# Patient Record
Sex: FEMALE | Race: WHITE | NOT HISPANIC OR LATINO | Employment: OTHER | URBAN - METROPOLITAN AREA
[De-identification: names, ages, dates, MRNs, and addresses within clinical notes are randomized per-mention and may not be internally consistent; named-entity substitution may affect disease eponyms.]

---

## 2017-05-04 ENCOUNTER — GENERIC CONVERSION - ENCOUNTER (OUTPATIENT)
Dept: OTHER | Facility: OTHER | Age: 72
End: 2017-05-04

## 2017-05-04 DIAGNOSIS — Z12.31 ENCOUNTER FOR SCREENING MAMMOGRAM FOR MALIGNANT NEOPLASM OF BREAST: ICD-10-CM

## 2017-06-30 ENCOUNTER — HOSPITAL ENCOUNTER (OUTPATIENT)
Dept: MAMMOGRAPHY | Facility: MEDICAL CENTER | Age: 72
Discharge: HOME | End: 2017-06-30
Payer: MEDICARE

## 2017-06-30 DIAGNOSIS — Z12.31 ENCOUNTER FOR SCREENING MAMMOGRAM FOR MALIGNANT NEOPLASM OF BREAST: ICD-10-CM

## 2017-06-30 PROCEDURE — G0202 SCR MAMMO BI INCL CAD: HCPCS

## 2017-06-30 PROCEDURE — 77063 BREAST TOMOSYNTHESIS BI: CPT

## 2018-01-11 NOTE — MISCELLANEOUS
Message   Date: 19 Oct 2016 9:23 AM EST, Recorded By: Christina Eldridge For: Lady Ace   Caller: Darshan Oliveramaida, Self   Phone: [phone] (Home)   Reason: Other   pts  called and was concerned that pt had not had her breast U/S this year    pt had a 3D which was normal  explained to  that this is the new prorocol for breast density    Active Problems    1  Blood pressure elevated (401 9) (I10)   2  Breast self examination education, encounter for (V65 49) (Z71 89)   3  Dense breasts (793 82) (R92 2)   4  Encounter for screening for malignant neoplasm of cervix (V76 2) (Z12 4)   5  Encounter for screening mammogram for malignant neoplasm of breast (V76 12)   (Z12 31)   6  History of self breast exam   7  Osteoporosis (733 00) (M81 0)   8  Screening for human papillomavirus (HPV) (V73 81) (Z11 51)   9  Visit for routine gyn exam (V72 31) (Z01 419)   10  Vitamin D deficiency (268 9) (E55 9)    Current Meds   1  CVS Vitamin D CAPS Recorded   2  Daily Value Multivitamin TABS; Therapy: (Abran Torres) to Recorded   3  Flonase SUSP (Fluticasone Propionate); Therapy: (Recorded:05Jun2014) to Recorded   4  KlonoPIN 0 5 MG Oral Tablet (ClonazePAM); Therapy: (Recorded:[id number]) to Recorded   5  Move Free TABS; Therapy: (Abran Torres) to Recorded   6  ZyPREXA 10 MG Oral Tablet (OLANZapine); Therapy: (Recorded:[id number]) to Recorded    Allergies    1   Percodan TABS    Signatures   Electronically signed by : Ashley July, ; Oct 19 2016  9:24AM EST                       (Author)

## 2018-01-15 NOTE — MISCELLANEOUS
Message   Date: 04 May 2017 11:58 AM EST, Recorded By: Thaddeus Dancer For: Tuyet Neumann: Sergo Ortiz, Self   Phone: [phone] (Home)   Reason: Other   mammo script mailed to pt        Active Problems    1  Blood pressure elevated (401 9) (I10)   2  Breast self examination education, encounter for (V65 49) (Z71 89)   3  Dense breasts (793 82) (R92 2)   4  Encounter for screening for malignant neoplasm of cervix (V76 2) (Z12 4)   5  Encounter for screening mammogram for malignant neoplasm of breast (V76 12)   (Z12 31)   6  History of self breast exam   7  Osteoporosis (733 00) (M81 0)   8  Screening for human papillomavirus (HPV) (V73 81) (Z11 51)   9  Visit for routine gyn exam (V72 31) (Z01 419)   10  Vitamin D deficiency (268 9) (E55 9)    Current Meds   1  CVS Vitamin D CAPS Recorded   2  Daily Value Multivitamin TABS; Therapy: (Wenda Viviana) to Recorded   3  Flonase SUSP (Fluticasone Propionate); Therapy: (Recorded:05Jun2014) to Recorded   4  KlonoPIN 0 5 MG Oral Tablet (ClonazePAM); Therapy: (Recorded:[id number]) to Recorded   5  Move Free TABS; Therapy: (Wenda Viviana) to Recorded   6  ZyPREXA 10 MG Oral Tablet (OLANZapine); Therapy: (Recorded:[id number]) to Recorded    Allergies    1  Percodan TABS    Plan  Encounter for screening mammogram for malignant neoplasm of breast    · MAMMO SCREENING BILATERAL W 3D & CAD; Status:Hold For -  Scheduling,Retrospective Authorization;  Requested [id number];     Signatures   Electronically signed by : Kristina Jackson, ; May  4 2017 11:59AM EST                       (Author)

## 2018-01-17 NOTE — RESULT NOTES
Message   Osteopenia, bone density has improved     Verified Results  * DXA BONE DENSITY SPINE HIP AND PELVIS 20Jun2016 11:13AM [place]      Test Name Result Flag Reference   DXA BONE DENSITY SPINE HIP AND PELVIS (Report)     CENTRAL DXA SCAN     CLINICAL HISTORY:  72year old post-menopausal  female risk factors include postmenopausal, osteopenia, estrogen deficiency  TECHNIQUE: Bone densitometry was performed using a Hologic Tyler C bone densitometer  Regions of interest appear properly placed  There are no obvious fractures or other confounding variables which could limit the study  None     COMPARISON: Follow-up     RESULTS:    LUMBAR SPINE: L1-L3:   BMD 0 807 gm/cm2   T-score below normal, -1 9, however, L4 is excluded from evaluation due to the presence of degenerative or osteoarthritic changes which on the spine image also affect L3  The real T score is -2 35 and unchanged from 2014  Z-score 0 1     LEFT TOTAL HIP:   BMD 0 682 gm/cm2   T-score below normal, -2 1   Z-score -0 6     LEFT FEMORAL NECK:   BMD 0 637 gm/cm2   T-score below normal, -1 9 and 18% higher than in 2014  Statistically significant change  Z-score -0 1     The Frax score in this patient identifies the risk of a major osteoporotic fracture in the next 10 years at 10% and a hip fracture risk of 1 8%  ASSESSMENT:   1  Based on the WHO classification, this study identifies moderate osteopenia at the spine, total hip, and femoral neck areas and the patient is considered at risk for fracture  2  A daily intake of calcium of at least 1200 mg and vitamin D, 800-1000 IU, as well as weight bearing and muscle strengthening exercise, fall prevention and avoidance of tobacco and excessive alcohol intake as basic preventive measures are recommended  3  Repeat DXA scan in 18-24 months as clinically indicated        WHO CLASSIFICATION:   Normal (a T-score of -1 0 or higher)   Low bone mineral density (a T-score of less than -1 0 but higher than -2 5)   Osteoporosis (a T-score of -2 5 or less)   Severe osteoporosis (a T-score of -2 5 or less with a fragility fracture)      Thank you for allowing us the opportunity to participate in your patient care  The expanded DEXA report will no longer be arriving in your mail  If you desire to view the full report please contact [address] [place] [address] or access the PACS system         Workstation performed: [id number]

## 2018-08-16 ENCOUNTER — ANNUAL EXAM (OUTPATIENT)
Dept: OBGYN CLINIC | Facility: CLINIC | Age: 73
End: 2018-08-16
Payer: MEDICARE

## 2018-08-16 VITALS
HEIGHT: 65 IN | WEIGHT: 112.2 LBS | SYSTOLIC BLOOD PRESSURE: 120 MMHG | DIASTOLIC BLOOD PRESSURE: 70 MMHG | BODY MASS INDEX: 18.69 KG/M2

## 2018-08-16 DIAGNOSIS — M85.80 OSTEOPENIA, UNSPECIFIED LOCATION: ICD-10-CM

## 2018-08-16 DIAGNOSIS — Z01.419 ENCOUNTER FOR ANNUAL ROUTINE GYNECOLOGICAL EXAMINATION: Primary | ICD-10-CM

## 2018-08-16 DIAGNOSIS — Z12.31 ENCOUNTER FOR SCREENING MAMMOGRAM FOR BREAST CANCER: ICD-10-CM

## 2018-08-16 PROCEDURE — G0101 CA SCREEN;PELVIC/BREAST EXAM: HCPCS | Performed by: OBSTETRICS & GYNECOLOGY

## 2018-08-16 RX ORDER — ERGOCALCIFEROL (VITAMIN D2) 10 MCG
TABLET ORAL
COMMUNITY

## 2018-08-16 RX ORDER — CLONAZEPAM 0.5 MG/1
0.5 TABLET ORAL 4 TIMES DAILY
COMMUNITY
Start: 2018-08-15 | End: 2018-10-09

## 2018-08-16 RX ORDER — FLUTICASONE PROPIONATE 50 MCG
SPRAY, SUSPENSION (ML) NASAL
Refills: 1 | COMMUNITY
Start: 2018-07-10 | End: 2018-08-16

## 2018-08-16 RX ORDER — OLANZAPINE 10 MG/1
10 TABLET ORAL DAILY
Refills: 1 | COMMUNITY
Start: 2018-05-30

## 2018-08-16 RX ORDER — LANOLIN ALCOHOL/MO/W.PET/CERES
2 CREAM (GRAM) TOPICAL
COMMUNITY
Start: 2013-10-03 | End: 2020-07-10

## 2018-08-16 RX ORDER — YEAST,DRIED (S. CEREVISIAE)
POWDER (GRAM) ORAL
COMMUNITY

## 2018-08-16 RX ORDER — DIPHENOXYLATE HYDROCHLORIDE AND ATROPINE SULFATE 2.5; .025 MG/1; MG/1
1 TABLET ORAL
COMMUNITY
Start: 2012-05-14 | End: 2018-10-09

## 2018-08-16 RX ORDER — ERYTHROMYCIN 5 MG/G
OINTMENT OPHTHALMIC
Refills: 0 | COMMUNITY
Start: 2018-07-30 | End: 2020-07-10

## 2018-08-16 RX ORDER — FLUTICASONE PROPIONATE 50 MCG
SPRAY, SUSPENSION (ML) NASAL
COMMUNITY
End: 2018-08-16

## 2018-08-16 RX ORDER — CLONAZEPAM 0.5 MG/1
0.5 TABLET ORAL
COMMUNITY
Start: 2015-04-14

## 2018-08-16 RX ORDER — FLUTICASONE PROPIONATE 50 MCG
2 SPRAY, SUSPENSION (ML) NASAL
COMMUNITY
Start: 2018-07-10 | End: 2018-08-16

## 2018-08-16 RX ORDER — ERYTHROMYCIN 5 MG/G
OINTMENT OPHTHALMIC
COMMUNITY
Start: 2018-07-30 | End: 2018-10-09

## 2018-08-16 NOTE — PROGRESS NOTES
Assessment/Plan:    Dense breast tissue-mammogram is due now, 3D mammogram ordered and she will have this scheduled    Osteopenia-she exercises regularly, she takes calcium and vitamin-D and DEXA was ordered as she is due    Colonoscopy is up to date    No problem-specific Assessment & Plan notes found for this encounter  Diagnoses and all orders for this visit:    Encounter for annual routine gynecological examination    Encounter for screening mammogram for breast cancer  -     Mammo screening bilateral w 3d & cad; Future    Osteopenia, unspecified location  -     DXA bone density spine hip and pelvis; Future    Other orders  -     clonazePAM (KlonoPIN) 0 5 mg tablet;   -     clonazePAM (KlonoPIN) 0 5 mg tablet; Take 0 5 mg by mouth  -     Cholecalciferol (CVS VITAMIN D) 2000 units CAPS; Take by mouth  -     Multiple Vitamin (DAILY VALUE MULTIVITAMIN) TABS; Take by mouth  -     erythromycin (ILOTYCIN) ophthalmic ointment; APPLY TO THE RIGHT LOWER LID THREE TIMES DAILY UNTIL CLEAR  -     erythromycin (ILOTYCIN) ophthalmic ointment; Apply to right lower lid three times per day until clear  -     Discontinue: fluticasone (FLONASE) 50 mcg/act nasal spray; into each nostril  -     Discontinue: fluticasone (FLONASE) 50 mcg/act nasal spray; 2 sprays into each nostril  -     Discontinue: fluticasone (FLONASE) 50 mcg/act nasal spray; SPRAY 2 SPRAYS INTO EACH NOSTRIL EVERY DAY  -     glucosamine-chondroitin 500-400 MG tablet; Take 2 tablets by mouth  -     Glucos-Chond-Hyal Ac-Ca Fructo (MOVE FREE [facility]) TABS; Take by mouth  -     multivitamin (THERAGRAN) TABS; Take 1 tablet by mouth  -     OLANZapine (ZyPREXA) 10 mg tablet; Take 10 mg by mouth daily          Subjective:      Patient ID: Ti Sharma is a 71 y o  female  Patient here for yearly  She has no gyn complaints  She had sciatic pain this past year but this seems to be getting better    She is due for mammogram, she has dense breast tissue  She is also due for a DEXA scan as she has osteopenia that did not require treatment  The following portions of the patient's history were reviewed and updated as appropriate: allergies, current medications, past family history, past medical history, past social history, past surgical history and problem list     Review of Systems   Constitutional: Negative  HENT: Negative  Eyes: Negative  Respiratory: Negative  Cardiovascular: Negative  Gastrointestinal: Negative  Endocrine: Negative  Genitourinary: Negative  Musculoskeletal: Negative  Skin: Negative  Allergic/Immunologic: Negative  Neurological: Negative  Hematological: Negative  Psychiatric/Behavioral: Negative  Objective:      /70 (BP Location: Left arm, Patient Position: Sitting)   Ht 5' 5" (1 651 m)   Wt 50 9 kg (112 lb 3 2 oz)   Breastfeeding? No   BMI 18 67 kg/m²          Physical Exam   Constitutional: She appears well-developed  Neck: No tracheal deviation present  No thyromegaly present  Cardiovascular: Normal rate and regular rhythm  Pulmonary/Chest: Effort normal and breath sounds normal  Right breast exhibits no inverted nipple, no mass, no nipple discharge, no skin change and no tenderness  Left breast exhibits no inverted nipple, no mass, no nipple discharge, no skin change and no tenderness  Breasts are symmetrical    Examined seated and supine   Abdominal: Soft  She exhibits no distension and no mass  There is no tenderness  Genitourinary: Rectum normal, vagina normal and uterus normal  No labial fusion  There is no rash, tenderness, lesion or injury on the right labia  There is no rash, tenderness, lesion or injury on the left labia  Cervix exhibits no motion tenderness, no discharge and no friability  Right adnexum displays no mass, no tenderness and no fullness  Left adnexum displays no mass, no tenderness and no fullness  Vitals reviewed

## 2018-09-14 ENCOUNTER — HOSPITAL ENCOUNTER (OUTPATIENT)
Dept: MAMMOGRAPHY | Facility: MEDICAL CENTER | Age: 73
Discharge: HOME | End: 2018-09-14
Payer: MEDICARE

## 2018-09-14 ENCOUNTER — HOSPITAL ENCOUNTER (OUTPATIENT)
Dept: BONE DENSITY | Facility: MEDICAL CENTER | Age: 73
Discharge: HOME | End: 2018-09-14
Payer: MEDICARE

## 2018-09-14 DIAGNOSIS — Z12.31 ENCOUNTER FOR SCREENING MAMMOGRAM FOR BREAST CANCER: ICD-10-CM

## 2018-09-14 DIAGNOSIS — M85.80 OSTEOPENIA, UNSPECIFIED LOCATION: ICD-10-CM

## 2018-09-14 PROCEDURE — 77067 SCR MAMMO BI INCL CAD: CPT

## 2018-09-14 PROCEDURE — 77063 BREAST TOMOSYNTHESIS BI: CPT

## 2018-09-14 PROCEDURE — 77080 DXA BONE DENSITY AXIAL: CPT

## 2018-09-18 ENCOUNTER — TELEPHONE (OUTPATIENT)
Dept: ENDOCRINOLOGY | Facility: CLINIC | Age: 73
End: 2018-09-18

## 2018-09-18 NOTE — TELEPHONE ENCOUNTER
----- Message from Chasity Lopez MD sent at 9/18/2018  8:56 AM EDT -----  Please call the patient regarding her abnormal result  Has osteoporosis  She needs a follow-up appointment  We have not seen her since 2016

## 2018-09-18 NOTE — TELEPHONE ENCOUNTER
Spoke to  and provided DXA scan and patient has an appt with Piedad Edmondson on 9/19  Will discuss  Findings with Billy Rodriguez at that appt

## 2018-09-18 NOTE — PROGRESS NOTES
Please call the patient regarding her abnormal result  Has osteoporosis  She needs a follow-up appointment  We have not seen her since 2016

## 2018-09-19 ENCOUNTER — OFFICE VISIT (OUTPATIENT)
Dept: ENDOCRINOLOGY | Facility: CLINIC | Age: 73
End: 2018-09-19
Payer: MEDICARE

## 2018-09-19 VITALS
WEIGHT: 113.6 LBS | DIASTOLIC BLOOD PRESSURE: 68 MMHG | HEART RATE: 93 BPM | SYSTOLIC BLOOD PRESSURE: 110 MMHG | HEIGHT: 64 IN | BODY MASS INDEX: 19.39 KG/M2

## 2018-09-19 DIAGNOSIS — M81.0 AGE RELATED OSTEOPOROSIS, UNSPECIFIED PATHOLOGICAL FRACTURE PRESENCE: ICD-10-CM

## 2018-09-19 DIAGNOSIS — E55.9 VITAMIN D DEFICIENCY: Primary | ICD-10-CM

## 2018-09-19 PROCEDURE — 99213 OFFICE O/P EST LOW 20 MIN: CPT | Performed by: NURSE PRACTITIONER

## 2018-09-19 RX ORDER — FLUTICASONE PROPIONATE 50 MCG
1 SPRAY, SUSPENSION (ML) NASAL DAILY
COMMUNITY

## 2018-09-19 NOTE — PATIENT INSTRUCTIONS
Denosumab (By injection)   Denosumab ([id number]-[id number])  Treats osteoporosis, bone cancer, hypercalcemia, and other bone problems in patients who have cancer  Brand Name(s): Prolia, Xgeva   There may be other brand names for this medicine  When This Medicine Should Not Be Used: This medicine is not right for everyone  You should not receive it if you had an allergic reaction to denosumab or if you are pregnant  How to Use This Medicine:   Injectable  · A doctor or other health professional will give you this medicine  This medicine is usually given as a shot under the skin of your upper arm, upper thigh, or stomach  · This medicine should come with a Medication Guide  Ask your pharmacist for a copy if you do not have one  · Missed dose: Call your doctor or pharmacist for instructions  Drugs and Foods to Avoid:   Ask your doctor or pharmacist before using any other medicine, including over-the-counter medicines, vitamins, and herbal products  · Do not use Prolia® and Xgeva® together  They contain the same medicine  · Some medicines can affect how denosumab works  Tell your doctor if you are also using medicine that weakens your immune system, including a steroid or cancer medicine  Warnings While Using This Medicine:   · This medicine may cause birth defects if either partner is using it during conception or pregnancy  Tell your doctor right away if you or your partner becomes pregnant  Use an effective form of birth control  Women who are being treated with Eva Banana should continue using birth control for at least 5 months after the last dose  · Tell your doctor if you are breastfeeding, or if you have kidney disease, diabetes, gum disease, or an allergy to latex  Tell your doctor if you have problems with your thyroid, parathyroid, or digestive system    · This medicine may cause the following problems:  ¨ Low calcium levels in your blood  ¨ Increased risk of broken thigh bone  ¨ Increased risk of infections  ¨ Serious skin reactions  ¨ Severe bone, joint, or muscle pain  · This medicine can cause jaw problems  You must have regular dental exams while you are being treated with this medicine  Tell your dentist that you are using this medicine  Practice good oral hygiene  · Do not suddenly stop using Prolia® without checking first with your doctor  Doing so may increase risk for more fractures  Talk to your doctor about other medicine that you can take  · Your doctor will do lab tests at regular visits to check on the effects of this medicine  Keep all appointments  Possible Side Effects While Using This Medicine:   Call your doctor right away if you notice any of these side effects:  · Allergic reaction: Itching or hives, swelling in your face or hands, swelling or tingling in your mouth or throat, chest tightness, trouble breathing  · Blistering, peeling, red skin rash  · Chest pain, fast or uneven heartbeat, trouble breathing  · Fever, chills, cough, sore throat, body aches  · Lightheadedness, dizziness, fainting  · Muscle spasms or twitching, numbness or tingling in your fingers, toes, or lips  · Pain or burning during urination, change in how much or how often you urinate  · Pain, swelling, heavy feeling, or numbness in your mouth or jaw, loose teeth or other teeth problems  · Severe bone, joint, or muscle pain  · Unusual pain in your thigh, groin, or hip  If you notice these less serious side effects, talk with your doctor:   · Diarrhea, nausea  · Redness, pain, itching, burning, swelling, or a lump under your skin where the shot was given  · Tiredness or weakness  If you notice other side effects that you think are caused by this medicine, tell your doctor  Call your doctor for medical advice about side effects   You may report side effects to FDA at 7-945-FDA-6258  © 2017 [employer] Information is for End User's use only and may not be sold, redistributed or otherwise used for commercial purposes  The above information is an  only  It is not intended as medical advice for individual conditions or treatments  Talk to your doctor, nurse or pharmacist before following any medical regimen to see if it is safe and effective for you

## 2018-09-19 NOTE — PROGRESS NOTES
Established Patient Progress Note       Chief Complaint   Patient presents with    Osteoporosis        History of Present Illness:     Daisy Wood is a 71 y o  female with a history of osteoporosis and vitamin d deficiency  For the osteoporosis she is currently taking calcium and vitamin d supplementation in addition to dietary calcium  Her most recent dexa scan showed T score of -2 6 in left total hip  She does report some pain in left hip  She was previously on reclast and add a total of four doses back in 2016  Her dexa at that time showed improvement of her osteoporosis to osteopenia and reclast was stopped  She denies any recent falls or fractures  Patient Active Problem List   Diagnosis    Osteoporosis    Vitamin D deficiency      History reviewed  No pertinent past medical history  History reviewed  No pertinent surgical history  History reviewed  No pertinent family history    Social History   Substance Use Topics    Smoking status: Never Smoker    Smokeless tobacco: Never Used    Alcohol use No     Allergies   Allergen Reactions    Oxycodone GI Intolerance       Current Outpatient Prescriptions:     Cholecalciferol ([employer] VITAMIN D) 2000 units CAPS, Take by mouth, Disp: , Rfl:     clonazePAM (KlonoPIN) 0 5 mg tablet, Take 0 5 mg by mouth Takes morning at breakfast, after lunch and after dinner, bedtime , Disp: , Rfl:     erythromycin (ILOTYCIN) ophthalmic ointment, APPLY TO THE RIGHT LOWER LID THREE TIMES DAILY UNTIL CLEAR, Disp: , Rfl: 0    fluticasone (FLONASE) 50 mcg/act nasal spray, 1 spray into each nostril daily, Disp: , Rfl:     Glucos-Chond-Hyal Ac-Ca Fructo (MOVE FREE JOINT HEALTH ADVANCE) TABS, Take by mouth, Disp: , Rfl:     glucosamine-chondroitin 500-400 MG tablet, Take 2 tablets by mouth, Disp: , Rfl:     Multiple Vitamin (DAILY VALUE MULTIVITAMIN) TABS, Take by mouth, Disp: , Rfl:     multivitamin (THERAGRAN) TABS, Take 1 tablet by mouth, Disp: , Rfl:    OLANZapine (ZyPREXA) 10 mg tablet, Take 10 mg by mouth daily, Disp: , Rfl: 1    clonazePAM (KlonoPIN) 0 5 mg tablet, , Disp: , Rfl:     erythromycin (ILOTYCIN) ophthalmic ointment, Apply to right lower lid three times per day until clear, Disp: , Rfl:     Review of Systems   Constitutional: Negative for chills and fever  HENT: Negative for sore throat and trouble swallowing  Eyes: Negative for visual disturbance  Respiratory: Negative for shortness of breath  Cardiovascular: Negative for chest pain and palpitations  Gastrointestinal: Negative for abdominal pain, constipation and diarrhea  Endocrine: Negative for cold intolerance, heat intolerance, polydipsia, polyphagia and polyuria  Genitourinary: Negative for frequency  Musculoskeletal: Negative for arthralgias and myalgias  Skin: Negative for rash  Neurological: Negative for dizziness and tremors  Hematological: Negative for adenopathy  Psychiatric/Behavioral: Negative for sleep disturbance  All other systems reviewed and are negative  Physical Exam:  Body mass index is 19 5 kg/m²  /68   Pulse 93   Ht 5' 4" (1 626 m)   Wt 51 5 kg (113 lb 9 6 oz)   BMI 19 50 kg/m²    Wt Readings from Last 3 Encounters:   09/19/18 51 5 kg (113 lb 9 6 oz)   08/16/18 50 9 kg (112 lb 3 2 oz)   10/24/16 58 1 kg (128 lb 0 6 oz)       Physical Exam   Constitutional: She is oriented to person, place, and time  She appears well-developed and well-nourished  No distress  HENT:   Head: Normocephalic and atraumatic  Eyes: Conjunctivae are normal  Pupils are equal, round, and reactive to light  Neck: Normal range of motion  Neck supple  No thyromegaly present  Cardiovascular: Normal rate, regular rhythm and normal heart sounds  Pulmonary/Chest: Effort normal and breath sounds normal  No respiratory distress  She has no wheezes  She has no rales  Abdominal: Soft  Bowel sounds are normal  She exhibits no distension   There is no tenderness  Musculoskeletal: Normal range of motion  She exhibits no edema  Neurological: She is alert and oriented to person, place, and time  Skin: Skin is warm and dry  Psychiatric: She has a normal mood and affect  Vitals reviewed  Labs:       CENTRAL  DXA SCAN     CLINICAL HISTORY:   71year old post-menopausal  female risk factors include estrogen deficiency  Osteopenia      TECHNIQUE: Bone densitometry was performed using a [employer] W bone densitometer  Regions of interest appear properly placed  There are   other confounding variables which could limit the study  Degenerative or osteoarthritic changes are noted on   the spine image eliminating L4 from evaluation      COMPARISON:  Follow-up      RESULTS:   LUMBAR SPINE:  L1-L3:  BMD 0 802 gm/cm2  T-score below normal, -2 0 and unchanged from 2016 and 1% less than 2014  Z-score 0 2     LEFT TOTAL HIP:  BMD 0 631 gm/cm2  T-score below normal, -2 6, osteoporosis  Z-score -0 9     LEFT FEMORAL NECK:  BMD 0 609 gm/cm2  T-score below normal, -2 2 and 4% less than 2016 but 14% higher than 2014 when the T score was -2 8   Z-score -0 3     The left forearm BMD is 0 594 and the T score is below normal, -1 7  The Z score is 0 6      A 25-hydroxy vitamin D level, an intact PTH, and a comprehensive metabolic panel are suggested in this patient prior to treatment      Treatment might include intravenous Reclast or Prolia     IMPRESSION:  1   Based on the [facility] classification, this study identifies a diagnosis of osteoporosis notable at the total hip area and the patient is considered at  risk for fracture         2  A daily intake of calcium of at least 1200 mg and vitamin D, 800-1000 IU, as well as weight bearing and muscle strengthening exercise, fall prevention and avoidance of tobacco and excessive alcohol intake as basic preventive measures are recommended      3  Repeat DXA scan on the same equipment in 18-24 months as clinically indicated        Impression & Plan:    Problem List Items Addressed This Visit     Osteoporosis     Patient has had progression from osteopenia to osteoporosis based on recent dexa scan since reclast was stopped in 2016  Based on dexa scan results recommend PTH, vitamin d, and cmp  Based on results will start patient on Prolia  Educated on falls prevention and weight bearing exercise  Relevant Orders    Vitamin D 25 hydroxy    Comprehensive metabolic panel    PTH, intact Lab Collect Lab Collect    Vitamin D deficiency - Primary     Continue vitamin d supplementation          Relevant Orders    Vitamin D 25 hydroxy          Orders Placed This Encounter   Procedures    Vitamin D 25 hydroxy    Comprehensive metabolic panel     This is a patient instruction: Patient fasting for 8 hours or longer recommended   PTH, intact Lab Collect Lab Collect     Standing Status:   Future     Standing Expiration Date:   9/19/2019       Patient Instructions   Denosumab (By injection)   Denosumab ([id number])  Treats osteoporosis, bone cancer, hypercalcemia, and other bone problems in patients who have cancer  Brand Name(s): Prolia, Xgeva   There may be other brand names for this medicine  When This Medicine Should Not Be Used: This medicine is not right for everyone  You should not receive it if you had an allergic reaction to denosumab or if you are pregnant  How to Use This Medicine:   Injectable  · A doctor or other health professional will give you this medicine  This medicine is usually given as a shot under the skin of your upper arm, upper thigh, or stomach  · This medicine should come with a Medication Guide  Ask your pharmacist for a copy if you do not have one  · Missed dose: Call your doctor or pharmacist for instructions  Drugs and Foods to Avoid:   Ask your doctor or pharmacist before using any other medicine, including over-the-counter medicines, vitamins, and herbal products    · Do not use Prolia® and Rayetta Tesfaye together  They contain the same medicine  · Some medicines can affect how denosumab works  Tell your doctor if you are also using medicine that weakens your immune system, including a steroid or cancer medicine  Warnings While Using This Medicine:   · This medicine may cause birth defects if either partner is using it during conception or pregnancy  Tell your doctor right away if you or your partner becomes pregnant  Use an effective form of birth control  Women who are being treated with Rayetta Tesfaye should continue using birth control for at least 5 months after the last dose  · Tell your doctor if you are breastfeeding, or if you have kidney disease, diabetes, gum disease, or an allergy to latex  Tell your doctor if you have problems with your thyroid, parathyroid, or digestive system  · This medicine may cause the following problems:  ¨ Low calcium levels in your blood  ¨ Increased risk of broken thigh bone  ¨ Increased risk of infections  ¨ Serious skin reactions  ¨ Severe bone, joint, or muscle pain  · This medicine can cause jaw problems  You must have regular dental exams while you are being treated with this medicine  Tell your dentist that you are using this medicine  Practice good oral hygiene  · Do not suddenly stop using Prolia® without checking first with your doctor  Doing so may increase risk for more fractures  Talk to your doctor about other medicine that you can take  · Your doctor will do lab tests at regular visits to check on the effects of this medicine  Keep all appointments    Possible Side Effects While Using This Medicine:   Call your doctor right away if you notice any of these side effects:  · Allergic reaction: Itching or hives, swelling in your face or hands, swelling or tingling in your mouth or throat, chest tightness, trouble breathing  · Blistering, peeling, red skin rash  · Chest pain, fast or uneven heartbeat, trouble breathing  · Fever, chills, cough, sore throat, body aches  · Lightheadedness, dizziness, fainting  · Muscle spasms or twitching, numbness or tingling in your fingers, toes, or lips  · Pain or burning during urination, change in how much or how often you urinate  · Pain, swelling, heavy feeling, or numbness in your mouth or jaw, loose teeth or other teeth problems  · Severe bone, joint, or muscle pain  · Unusual pain in your thigh, groin, or hip  If you notice these less serious side effects, talk with your doctor:   · Diarrhea, nausea  · Redness, pain, itching, burning, swelling, or a lump under your skin where the shot was given  · Tiredness or weakness  If you notice other side effects that you think are caused by this medicine, tell your doctor  Call your doctor for medical advice about side effects  You may report side effects to FDA at 6-492-FDA-6227  © 2017 [address] Information is for End User's use only and may not be sold, redistributed or otherwise used for commercial purposes  The above information is an  only  It is not intended as medical advice for individual conditions or treatments  Talk to your doctor, nurse or pharmacist before following any medical regimen to see if it is safe and effective for you  Discussed with the patient and all questioned fully answered  She will call me if any problems arise  Follow-up appointment in 6 months       Counseled patient on diagnostic results, prognosis, risk and benefit of treatment options, instruction for management, importance of treatment compliance, Risk  factor reduction and impressions      Jorge Luis Biswas [address]

## 2018-09-19 NOTE — ASSESSMENT & PLAN NOTE
Patient has had progression from osteopenia to osteoporosis based on recent dexa scan since reclast was stopped in 2016  Based on dexa scan results recommend PTH, vitamin d, and cmp  Based on results will start patient on Prolia  Educated on falls prevention and weight bearing exercise

## 2018-09-27 ENCOUNTER — TELEPHONE (OUTPATIENT)
Dept: ENDOCRINOLOGY | Facility: CLINIC | Age: 73
End: 2018-09-27

## 2018-09-28 ENCOUNTER — TELEPHONE (OUTPATIENT)
Dept: ENDOCRINOLOGY | Facility: HOSPITAL | Age: 73
End: 2018-09-28

## 2018-10-02 NOTE — TELEPHONE ENCOUNTER
Patient was called and left a message on 9/24/18, 9/25/18 and 9/28/18 to schedule Prolia  We will try to get in contact with patient again

## 2018-10-09 ENCOUNTER — OFFICE VISIT (OUTPATIENT)
Dept: ENDOCRINOLOGY | Facility: CLINIC | Age: 73
End: 2018-10-09
Payer: MEDICARE

## 2018-10-09 VITALS
SYSTOLIC BLOOD PRESSURE: 120 MMHG | WEIGHT: 114 LBS | BODY MASS INDEX: 19.46 KG/M2 | DIASTOLIC BLOOD PRESSURE: 60 MMHG | HEART RATE: 92 BPM | HEIGHT: 64 IN

## 2018-10-09 DIAGNOSIS — M81.0 OSTEOPOROSIS, UNSPECIFIED OSTEOPOROSIS TYPE, UNSPECIFIED PATHOLOGICAL FRACTURE PRESENCE: Primary | ICD-10-CM

## 2018-10-09 DIAGNOSIS — E55.9 VITAMIN D DEFICIENCY: ICD-10-CM

## 2018-10-09 PROCEDURE — 99213 OFFICE O/P EST LOW 20 MIN: CPT | Performed by: INTERNAL MEDICINE

## 2018-10-09 PROCEDURE — 96372 THER/PROPH/DIAG INJ SC/IM: CPT | Performed by: INTERNAL MEDICINE

## 2018-10-09 NOTE — PROGRESS NOTES
Kinza Diaz 68 y o  female MRN: [id number]    Encounter: [id number]      Assessment/Plan     Assessment: This is a 68y o -year-old female with worsening osteoporosis  Plan:  1  Osteoporosis-today, we had a long discussion about using Prolia to treat her osteoporosis  She is agreeable to this  She will receive a Prolia injection today  CC:   Osteoporosis    History of Present Illness     HPI:  60-year-old woman with worsening osteoporosis at the left hip who presents for follow-up  She had many questions about Prolia injection  These were all answered  She denies any recent fractures  She had four total infusions of Reclast for the osteoporosis and despite this, her bone density at the left hip declined  Review of Systems    Historical Information   History reviewed  No pertinent past medical history  History reviewed  No pertinent surgical history  Social History   History   Alcohol Use No     History   Drug Use No     History   Smoking Status    Never Smoker   Smokeless Tobacco    Never Used     Family History: History reviewed  No pertinent family history  Meds/Allergies   Current Outpatient Prescriptions   Medication Sig Dispense Refill    Cholecalciferol ([employer]S VITAMIN D) 2000 units CAPS Take by mouth      clonazePAM (KlonoPIN) 0 5 mg tablet Take 0 5 mg by mouth Takes morning at breakfast, after lunch and after dinner, bedtime       erythromycin (ILOTYCIN) ophthalmic ointment APPLY TO THE RIGHT LOWER LID THREE TIMES DAILY UNTIL CLEAR  0    fluticasone (FLONASE) 50 mcg/act nasal spray 1 spray into each nostril daily      Glucos-Chond-Hyal Ac-Ca Fructo (MOVE FREE JOINT HEALTH ADVANCE) TABS Take by mouth      glucosamine-chondroitin 500-400 MG tablet Take 2 tablets by mouth      Multiple Vitamin (DAILY VALUE MULTIVITAMIN) TABS Take by mouth      OLANZapine (ZyPREXA) 10 mg tablet Take 10 mg by mouth daily  1     No current facility-administered medications for this visit  Allergies   Allergen Reactions    Oxycodone GI Intolerance       Objective   Vitals: Blood pressure 120/60, pulse 92, height 5' 4" (1 626 m), weight 51 7 kg (114 lb), not currently breastfeeding  Physical Exam    The history was obtained from the review of the chart, patient  Lab Results:          Imaging Studies:            Results for orders placed during the hospital encounter of 09/14/18   DXA bone density spine hip and pelvis    Impression 1  Based on the [facility] classification, this study identifies a diagnosis of osteoporosis notable at the total hip area and the patient is considered at  risk for fracture  2  A daily intake of calcium of at least 1200 mg and vitamin D, 800-1000 IU, as well as weight bearing and muscle strengthening exercise, fall prevention and avoidance of tobacco and excessive alcohol intake as basic preventive measures are recommended  3  Repeat DXA scan on the same equipment in 18-24 months as clinically indicated  The 10 year risk of hip fracture is 2 7%, with the 10 year risk of major osteoporotic fracture being 11%, as calculated by the [facility] fracture risk assessment tool (FRAX)  The current NOF guidelines recommend treating patients with FRAX 10 year risk score   of >3% for hip fracture and >20% for major osteoporotic fracture  WHO CLASSIFICATION:  Normal (a T-score of -1 0 or higher)  Low bone mineral density (a T-score of less than -1 0 but higher than -2 5)  Osteoporosis (a T-score of -2 5 or less)  Severe osteoporosis (a T-score of -2 5 or less with a fragility fracture)    Thank you for allowing us the opportunity to participate in your patient care  The expanded DEXA report will no longer be arriving in your mail  If you desire to view the full report please contact [address] [place] [address] or access the PACS system  Workstation performed: [id number]         I have personally reviewed pertinent reports        Portions of the record may have been created with voice recognition software  Occasional wrong word or "sound a like" substitutions may have occurred due to the inherent limitations of voice recognition software  Read the chart carefully and recognize, using context, where substitutions have occurred

## 2019-04-08 ENCOUNTER — TELEPHONE (OUTPATIENT)
Dept: ENDOCRINOLOGY | Facility: CLINIC | Age: 74
End: 2019-04-08

## 2019-04-12 ENCOUNTER — OFFICE VISIT (OUTPATIENT)
Dept: ENDOCRINOLOGY | Facility: CLINIC | Age: 74
End: 2019-04-12
Payer: MEDICARE

## 2019-04-12 DIAGNOSIS — M81.0 OSTEOPOROSIS, UNSPECIFIED OSTEOPOROSIS TYPE, UNSPECIFIED PATHOLOGICAL FRACTURE PRESENCE: Primary | ICD-10-CM

## 2019-09-10 ENCOUNTER — TRANSCRIBE ORDERS (OUTPATIENT)
Dept: ADMINISTRATIVE | Facility: HOSPITAL | Age: 74
End: 2019-09-10

## 2019-09-10 DIAGNOSIS — Z12.39 BREAST SCREENING: ICD-10-CM

## 2019-09-10 DIAGNOSIS — Z12.39 BREAST SCREENING, UNSPECIFIED: Primary | ICD-10-CM

## 2019-10-21 ENCOUNTER — OFFICE VISIT (OUTPATIENT)
Dept: ENDOCRINOLOGY | Facility: CLINIC | Age: 74
End: 2019-10-21
Payer: MEDICARE

## 2019-10-21 VITALS
HEART RATE: 68 BPM | BODY MASS INDEX: 19.16 KG/M2 | WEIGHT: 111.6 LBS | SYSTOLIC BLOOD PRESSURE: 138 MMHG | DIASTOLIC BLOOD PRESSURE: 88 MMHG

## 2019-10-21 DIAGNOSIS — E55.9 VITAMIN D DEFICIENCY: ICD-10-CM

## 2019-10-21 DIAGNOSIS — M81.0 AGE RELATED OSTEOPOROSIS, UNSPECIFIED PATHOLOGICAL FRACTURE PRESENCE: Primary | ICD-10-CM

## 2019-10-21 DIAGNOSIS — M81.0 OSTEOPOROSIS, UNSPECIFIED OSTEOPOROSIS TYPE, UNSPECIFIED PATHOLOGICAL FRACTURE PRESENCE: Primary | ICD-10-CM

## 2019-10-21 PROCEDURE — 96372 THER/PROPH/DIAG INJ SC/IM: CPT | Performed by: INTERNAL MEDICINE

## 2019-10-21 PROCEDURE — 99213 OFFICE O/P EST LOW 20 MIN: CPT | Performed by: INTERNAL MEDICINE

## 2019-10-21 NOTE — PATIENT INSTRUCTIONS
Calcium and Osteoporosis   WHAT YOU NEED TO KNOW:   Why is calcium important for osteoporosis? Calcium is important for osteoporosis because calcium helps build bone mass  Osteoporosis is a long-term medical condition that causes your body to break down more bone than it makes  Your bones become weak, brittle, and more likely to fracture  How much calcium do I need? · Women:      ¨ 19 to 50 years: 1,000 mg    ¨ Over 50: 1,200 mg    ¨ Pregnant or breastfeeding, 19 to 50 years: 1,000 mg    · Men:      ¨ 19 to 70: 1,000 mg    ¨ Over 70: 1,200 mg  Which foods are high in calcium? The following list shows the number of calcium milligrams (mg) per serving  Your healthcare provider or dietitian can help you create a balanced meal plan for your calcium needs  · Dairy:      ¨ 1 cup of low-fat plain yogurt (415 mg) or low-fat fruit yogurt (245 to 384 mg)    ¨ 1½ ounces of shredded cheddar cheese (306 mg) or part skim mozzarella cheese (275 mg)    ¨ 1 cup of skim, 2%, or whole milk (300 mg)    ¨ 1 cup of cottage cheese made with 2% milk fat (138 mg)    ¨ ½ cup of frozen yogurt (103 mg)    · Other foods:      ¨ 1 cup of calcium-fortified orange juice (300 mg)    ¨ ½ cup of cooked yogi greens (220 mg)    ¨ 4 canned sardines, with bones (242 mg)    ¨ ½ cup of tofu (with added calcium) (204 mg)  How can I get extra calcium? · Add powdered milk to puddings, cocoa, custard, or hot cereal     · Sift powdered milk into flour when you make cakes, cookies, or breads  · Use low-fat or fat-free milk instead of water in pancake mix, mashed potatoes, pudding, or hot breakfast cereal     · Add low-fat or fat-free cheese to salad, soup, or pasta  · Add tofu (with added calcium) to vegetable stir-story  · Take calcium supplements if you cannot get enough calcium from the foods you eat  Your body can absorb the most calcium from supplements when you take 500 mg or less at one time   Do not take more than 2,500 mg of calcium supplements each day  CARE AGREEMENT:   You have the right to help plan your care  Discuss treatment options with your caregivers to decide what care you want to receive  You always have the right to refuse treatment  The above information is an  only  It is not intended as medical advice for individual conditions or treatments  Talk to your doctor, nurse or pharmacist before following any medical regimen to see if it is safe and effective for you  © 2017 [address] Information is for End User's use only and may not be sold, redistributed or otherwise used for commercial purposes  All illustrations and images included in CareNotes® are the copyrighted property of [employer]  or Shivam Hannah

## 2019-10-21 NOTE — PROGRESS NOTES
Patient had Prolia SQ injection on her abdomen, patient tolerated injection well   Consent signed and scanned

## 2019-10-21 NOTE — PROGRESS NOTES
Rosezella Essex 69 y o  female MRN: [id number]    Encounter: [id number]      Assessment/Plan     Assessment: This is a 69y o -year-old female with osteoporosis and vitamin D deficiency  Plan:  1  Osteoporosis-she is due for Prolia injection today  Repeat DEXA scan in September 2020  I did educate her that she should be obtaining 1200 mg of calcium per day  2  Vitamin-D deficiency-continue vitamin-D supplementation  CC:   Osteoporosis    History of Present Illness     HPI:  61-year-old woman with osteoporosis who is currently being treated with Prolia  She states that she fell when she was in for a last winter and had pelvic fractures  These have healed  She no longer requires the use of a walker  She denies any fall since  For vitamin-D deficiency, she is on supplementation  Review of Systems   Constitutional: Negative for chills and fever  Respiratory: Negative for shortness of breath  Cardiovascular: Negative for chest pain  Gastrointestinal: Negative for constipation, diarrhea, nausea and vomiting  All other systems reviewed and are negative        Historical Information   Past Medical History:   Diagnosis Date    Osteoporosis      Past Surgical History:   Procedure Laterality Date    APPENDECTOMY      HERNIA REPAIR      TUBAL LIGATION       Social History   Social History     Substance and Sexual Activity   Alcohol Use No     Social History     Substance and Sexual Activity   Drug Use No     Social History     Tobacco Use   Smoking Status Never Smoker   Smokeless Tobacco Never Used     Family History:   Family History   Problem Relation Age of Onset    Diabetes unspecified Mother        Meds/Allergies   Current Outpatient Medications   Medication Sig Dispense Refill    Cholecalciferol ([employer] VITAMIN D) 2000 units CAPS Take by mouth      clonazePAM (KlonoPIN) 0 5 mg tablet Take 0 5 mg by mouth Takes morning at breakfast, after lunch and after dinner, bedtime       fluticasone (FLONASE) 50 mcg/act nasal spray 1 spray into each nostril daily      Glucos-Chond-Hyal Ac-Ca Fructo ([place]) TABS Take by mouth      glucosamine-chondroitin 500-400 MG tablet Take 2 tablets by mouth      Multiple Vitamin (DAILY VALUE MULTIVITAMIN) TABS Take by mouth      OLANZapine (ZyPREXA) 10 mg tablet Take 10 mg by mouth daily  1    erythromycin (ILOTYCIN) ophthalmic ointment APPLY TO THE RIGHT LOWER LID THREE TIMES DAILY UNTIL CLEAR  0     Current Facility-Administered Medications   Medication Dose Route Frequency Provider Last Rate Last Dose    denosumab (PROLIA) subcutaneous injection 60 mg  60 mg Subcutaneous Once Shahid Dias MD         Allergies   Allergen Reactions    Oxycodone GI Intolerance       Objective   Vitals: Blood pressure 138/88, pulse 68, weight 50 6 kg (111 lb 9 6 oz), not currently breastfeeding  Physical Exam   Constitutional: She is oriented to person, place, and time  She appears well-developed and well-nourished  No distress  HENT:   Head: Normocephalic and atraumatic  Mouth/Throat: Oropharynx is clear and moist and mucous membranes are normal  No oropharyngeal exudate  Eyes: Conjunctivae, EOM and lids are normal  Right eye exhibits no discharge  Left eye exhibits no discharge  No scleral icterus  Neck: Neck supple  No thyromegaly present  Cardiovascular: Normal rate, regular rhythm and normal heart sounds  Exam reveals no gallop and no friction rub  No murmur heard  Pulmonary/Chest: Effort normal and breath sounds normal  No respiratory distress  She has no wheezes  Abdominal: Soft  Bowel sounds are normal  She exhibits no distension  There is no tenderness  Musculoskeletal: Normal range of motion  She exhibits no edema, tenderness or deformity  Lymphadenopathy:        Head (right side): No occipital adenopathy present  Head (left side): No occipital adenopathy present          Right: No supraclavicular adenopathy present  Left: No supraclavicular adenopathy present  Neurological: She is alert and oriented to person, place, and time  No cranial nerve deficit  Skin: Skin is warm and intact  No rash noted  She is not diaphoretic  No erythema  Psychiatric: She has a normal mood and affect  Vitals reviewed  The history was obtained from the review of the chart, patient and family  Lab Results:   Most recent calcium level was 9 2 with an albumin of 4 2  Imaging Studies:            Results for orders placed during the hospital encounter of 09/14/18   DXA bone density spine hip and pelvis    Impression 1  Based on the [facility] classification, this study identifies a diagnosis of osteoporosis notable at the total hip area and the patient is considered at  risk for fracture  2  A daily intake of calcium of at least 1200 mg and vitamin D, 800-1000 IU, as well as weight bearing and muscle strengthening exercise, fall prevention and avoidance of tobacco and excessive alcohol intake as basic preventive measures are recommended  3  Repeat DXA scan on the same equipment in 18-24 months as clinically indicated  The 10 year risk of hip fracture is 2 7%, with the 10 year risk of major osteoporotic fracture being 11%, as calculated by the [facility] fracture risk assessment tool (FRAX)  The current NOF guidelines recommend treating patients with FRAX 10 year risk score   of >3% for hip fracture and >20% for major osteoporotic fracture  WHO CLASSIFICATION:  Normal (a T-score of -1 0 or higher)  Low bone mineral density (a T-score of less than -1 0 but higher than -2 5)  Osteoporosis (a T-score of -2 5 or less)  Severe osteoporosis (a T-score of -2 5 or less with a fragility fracture)    Thank you for allowing us the opportunity to participate in your patient care  The expanded DEXA report will no longer be arriving in your mail   If you desire to view the full report please contact [facility]'s medical records or access the PACS system  Workstation performed: [id number]          I have personally reviewed pertinent reports  Portions of the record may have been created with voice recognition software  Occasional wrong word or "sound a like" substitutions may have occurred due to the inherent limitations of voice recognition software  Read the chart carefully and recognize, using context, where substitutions have occurred

## 2019-10-25 ENCOUNTER — HOSPITAL ENCOUNTER (OUTPATIENT)
Dept: MAMMOGRAPHY | Facility: MEDICAL CENTER | Age: 74
Discharge: HOME | End: 2019-10-25
Payer: MEDICARE

## 2019-10-25 VITALS — WEIGHT: 111 LBS | BODY MASS INDEX: 18.95 KG/M2 | HEIGHT: 64 IN

## 2019-10-25 DIAGNOSIS — Z12.39 BREAST SCREENING: ICD-10-CM

## 2019-10-25 DIAGNOSIS — Z12.31 ENCOUNTER FOR SCREENING MAMMOGRAM FOR MALIGNANT NEOPLASM OF BREAST: ICD-10-CM

## 2019-10-25 PROCEDURE — 77063 BREAST TOMOSYNTHESIS BI: CPT

## 2019-10-25 PROCEDURE — 77067 SCR MAMMO BI INCL CAD: CPT

## 2019-10-28 DIAGNOSIS — R92.8 ABNORMAL MAMMOGRAM: Primary | ICD-10-CM

## 2019-11-07 ENCOUNTER — HOSPITAL ENCOUNTER (OUTPATIENT)
Dept: ULTRASOUND IMAGING | Facility: CLINIC | Age: 74
Discharge: HOME | End: 2019-11-07
Payer: MEDICARE

## 2019-11-07 ENCOUNTER — HOSPITAL ENCOUNTER (OUTPATIENT)
Dept: MAMMOGRAPHY | Facility: CLINIC | Age: 74
Discharge: HOME | End: 2019-11-07
Payer: MEDICARE

## 2019-11-07 VITALS — BODY MASS INDEX: 18.95 KG/M2 | WEIGHT: 111 LBS | HEIGHT: 64 IN

## 2019-11-07 DIAGNOSIS — R92.8 ABNORMAL MAMMOGRAM: ICD-10-CM

## 2019-11-07 PROCEDURE — 76642 ULTRASOUND BREAST LIMITED: CPT

## 2019-11-07 PROCEDURE — 77065 DX MAMMO INCL CAD UNI: CPT

## 2019-11-07 PROCEDURE — G0279 TOMOSYNTHESIS, MAMMO: HCPCS

## 2019-11-11 ENCOUNTER — TELEPHONE (OUTPATIENT)
Dept: OBGYN CLINIC | Facility: CLINIC | Age: 74
End: 2019-11-11

## 2019-11-11 NOTE — TELEPHONE ENCOUNTER
----- Message from Sherryle Pimple, DO sent at 11/7/2019  9:21 PM EST -----  Pt needs breast MRI to further evaluate the distortion in the right breast

## 2019-11-15 ENCOUNTER — TELEPHONE (OUTPATIENT)
Dept: OBGYN CLINIC | Facility: CLINIC | Age: 74
End: 2019-11-15

## 2019-11-15 NOTE — TELEPHONE ENCOUNTER
Patient's  called for information on the process of ordering patient's breast MRI which she will have done in [place]  Per radiology recommendation, patient needs a Bilateral breast MRI, correct?

## 2019-11-18 DIAGNOSIS — N64.89 DISTORTION OF CONTOUR OF BREAST: ICD-10-CM

## 2019-11-18 DIAGNOSIS — N63.0 BREAST MASS: Primary | ICD-10-CM

## 2019-11-25 ENCOUNTER — TELEPHONE (OUTPATIENT)
Dept: OBGYN CLINIC | Facility: CLINIC | Age: 74
End: 2019-11-25

## 2019-11-25 NOTE — TELEPHONE ENCOUNTER
----- Message from Javier Rodríguez DO sent at 11/22/2019 10:03 AM EST -----  It looks like she will need a biopsy  There is a a concern in the right breast, they recommended a 2nd look ultrasound but she already had this which is why she ended up with the MRI  The biopsy could be done by Radiology will as an MRI guided biopsy  I know that she is in [place] but she will have to address this  It should not wait until she comes home in the spring    Please let me know

## 2019-11-26 DIAGNOSIS — N63.0 BREAST MASS: Primary | ICD-10-CM

## 2019-12-02 ENCOUNTER — TELEPHONE (OUTPATIENT)
Dept: OBGYN CLINIC | Facility: CLINIC | Age: 74
End: 2019-12-02

## 2019-12-02 DIAGNOSIS — R92.8 MAMMOGRAM ABNORMAL: Primary | ICD-10-CM

## 2019-12-12 ENCOUNTER — DOCUMENTATION (OUTPATIENT)
Dept: OBGYN CLINIC | Facility: CLINIC | Age: 74
End: 2019-12-12

## 2019-12-12 NOTE — PROGRESS NOTES
I received a phone call from Dr Susan Pimentel, the radiologist from [facility]  He was seeing Benson Uribe for an MRI guided breast biopsy for a small, 4-5 mm lesion  On MRI today, it appears less conspicuous but is still there  When he attempted to do a biopsy, he was unable to do so as her breast is very small and the biopsy needle has a large opening and is under vacuum  He reviewed this with the patient and her   He recommended a six-month follow-up with a breast MRI  At that time, they will be back home in [place]  If the lesion is larger, he recommended an ultrasound guided biopsy which should be easier to achieve  If not, she may need another attempt at a MRI guided biopsy, possibly with a medial approach  The patient and her  are agreeable with this and I will order a breast MRI for Sophy

## 2020-04-02 ENCOUNTER — TELEPHONE (OUTPATIENT)
Dept: ENDOCRINOLOGY | Facility: CLINIC | Age: 75
End: 2020-04-02

## 2020-04-09 ENCOUNTER — TELEPHONE (OUTPATIENT)
Dept: OBGYN CLINIC | Facility: CLINIC | Age: 75
End: 2020-04-09

## 2020-06-04 ENCOUNTER — TELEPHONE (OUTPATIENT)
Dept: ENDOCRINOLOGY | Facility: CLINIC | Age: 75
End: 2020-06-04

## 2020-06-05 ENCOUNTER — TELEPHONE (OUTPATIENT)
Dept: ENDOCRINOLOGY | Facility: CLINIC | Age: 75
End: 2020-06-05

## 2020-06-05 ENCOUNTER — CLINICAL SUPPORT (OUTPATIENT)
Dept: ENDOCRINOLOGY | Facility: CLINIC | Age: 75
End: 2020-06-05
Payer: MEDICARE

## 2020-06-05 DIAGNOSIS — M81.0 OSTEOPOROSIS, UNSPECIFIED OSTEOPOROSIS TYPE, UNSPECIFIED PATHOLOGICAL FRACTURE PRESENCE: Primary | ICD-10-CM

## 2020-06-05 DIAGNOSIS — M81.0 AGE RELATED OSTEOPOROSIS, UNSPECIFIED PATHOLOGICAL FRACTURE PRESENCE: ICD-10-CM

## 2020-06-05 PROCEDURE — 96372 THER/PROPH/DIAG INJ SC/IM: CPT | Performed by: INTERNAL MEDICINE

## 2020-06-05 PROCEDURE — 96372 THER/PROPH/DIAG INJ SC/IM: CPT

## 2020-06-09 DIAGNOSIS — N63.0 BREAST MASS: Primary | ICD-10-CM

## 2020-06-28 ENCOUNTER — HOSPITAL ENCOUNTER (OUTPATIENT)
Dept: RADIOLOGY | Facility: HOSPITAL | Age: 75
Discharge: HOME | End: 2020-06-28
Attending: OBSTETRICS & GYNECOLOGY
Payer: MEDICARE

## 2020-06-28 DIAGNOSIS — N63.0 BREAST MASS: ICD-10-CM

## 2020-06-28 PROCEDURE — C8908 MRI W/O FOL W/CONT, BREAST,: HCPCS

## 2020-06-28 PROCEDURE — C8937 CAD BREAST MRI: HCPCS

## 2020-06-28 PROCEDURE — A9585 GADOBUTROL INJECTION: HCPCS | Performed by: OBSTETRICS & GYNECOLOGY

## 2020-06-28 RX ADMIN — GADOBUTROL 5 ML: 604.72 INJECTION INTRAVENOUS at 14:21

## 2020-07-10 ENCOUNTER — ANNUAL EXAM (OUTPATIENT)
Dept: OBGYN CLINIC | Facility: CLINIC | Age: 75
End: 2020-07-10
Payer: MEDICARE

## 2020-07-10 VITALS
TEMPERATURE: 97.8 F | SYSTOLIC BLOOD PRESSURE: 140 MMHG | WEIGHT: 105.8 LBS | DIASTOLIC BLOOD PRESSURE: 92 MMHG | BODY MASS INDEX: 18.06 KG/M2 | HEIGHT: 64 IN

## 2020-07-10 DIAGNOSIS — Z12.31 ENCOUNTER FOR SCREENING MAMMOGRAM FOR BREAST CANCER: Primary | ICD-10-CM

## 2020-07-10 DIAGNOSIS — Z01.419 ENCOUNTER FOR ANNUAL ROUTINE GYNECOLOGICAL EXAMINATION: ICD-10-CM

## 2020-07-10 PROCEDURE — G0101 CA SCREEN;PELVIC/BREAST EXAM: HCPCS | Performed by: OBSTETRICS & GYNECOLOGY

## 2020-07-10 RX ORDER — ZOLEDRONIC ACID 5 MG/100ML
INJECTION, SOLUTION INTRAVENOUS
COMMUNITY
Start: 2014-09-12

## 2020-07-10 NOTE — PROGRESS NOTES
Assessment/Plan:     She does not require a Pap    Breast MRI reviewed with the patient and her   They recommend that she return to follow-up screening  She will be due for a mammogram at the end of October and this was ordered for her  She is aware to call with any concerns  Discussed self breast exams    colon cancer screening  -She has regular colonoscopy, she is due next year  Osteoporosis- this is managed by endocrinology, she is getting Prolia  She will continue seeing them  discussed regular exercise and a healthy diet      No problem-specific Assessment & Plan notes found for this encounter  Diagnoses and all orders for this visit:    Encounter for screening mammogram for breast cancer  -     Mammo screening bilateral w 3d & cad; Future    Encounter for annual routine gynecological examination    Other orders  -     zoledronic acid (RECLAST) 5 mg/100 mL IV infusion (premix); Zoledronic Acid 5 MG/100ML Intravenous Solution  INFUSE 5MG INTRAVENOUSLY OVER 15 MINUTES AS DIRECTED  Quantity: 1;  Refills: 0       Sancho GARRETT ;  Started 12-Sep-2014  [id number] ML Bottle          Subjective:      Patient ID: Terry Mohan is a 69 y o  female  Patient here for yearly  She had an abnormal mammogram and an abnormal breast MRI  MRI guided biopsy was attempted but unable to be completed due to the size of the potential lesion and also the size of her breast   She just had a follow-up MRI and the area of concern was not visualized  She is able to return to routine screening  She states that she has lost weight since last year, she was 112 lb at her last yearly and is 105 lb today  She feels like she is eating and she does feel well  She is quite anxious and she thinks that this affects her appetite at times  Normal Pap and negative HPV in 2013  She was on Reclast for osteoporosis and is now on Prolia        The following portions of the patient's history were reviewed and updated as appropriate: allergies, current medications, past family history, past medical history, past social history, past surgical history and problem list     Review of Systems   Constitutional: Negative  Gastrointestinal: Negative  Genitourinary: Negative  Objective:      /92 (BP Location: Right arm, Patient Position: Sitting, Cuff Size: Standard)   Temp 97 8 °F (36 6 °C)   Ht 5' 4" (1 626 m)   Wt 48 kg (105 lb 12 8 oz)   BMI 18 16 kg/m²          Physical Exam   Constitutional: She appears well-developed  Neck: No tracheal deviation present  No thyromegaly present  Cardiovascular: Normal rate and regular rhythm  Pulmonary/Chest: Effort normal and breath sounds normal  Right breast exhibits no inverted nipple, no mass, no nipple discharge, no skin change and no tenderness  Left breast exhibits no inverted nipple, no mass, no nipple discharge, no skin change and no tenderness  Breasts are symmetrical    Examined seated and supine   Abdominal: Soft  She exhibits no distension and no mass  There is no tenderness  Genitourinary: Rectum normal, vagina normal and uterus normal  No labial fusion  There is no rash, tenderness, lesion or injury on the right labia  There is no rash, tenderness, lesion or injury on the left labia  Cervix exhibits no motion tenderness, no discharge and no friability  Right adnexum displays no mass, no tenderness and no fullness  Left adnexum displays no mass, no tenderness and no fullness  Vitals reviewed

## 2020-10-28 ENCOUNTER — HOSPITAL ENCOUNTER (OUTPATIENT)
Dept: MAMMOGRAPHY | Facility: MEDICAL CENTER | Age: 75
Discharge: HOME | End: 2020-10-28
Payer: MEDICARE

## 2020-10-28 VITALS — HEIGHT: 64 IN | WEIGHT: 110 LBS | BODY MASS INDEX: 18.78 KG/M2

## 2020-10-28 DIAGNOSIS — Z12.31 ENCOUNTER FOR SCREENING MAMMOGRAM FOR BREAST CANCER: ICD-10-CM

## 2020-10-28 PROCEDURE — 77063 BREAST TOMOSYNTHESIS BI: CPT

## 2020-10-28 PROCEDURE — 77067 SCR MAMMO BI INCL CAD: CPT

## 2021-08-24 ENCOUNTER — OFFICE VISIT (OUTPATIENT)
Dept: ENDOCRINOLOGY | Facility: CLINIC | Age: 76
End: 2021-08-24
Payer: MEDICARE

## 2021-08-24 VITALS
SYSTOLIC BLOOD PRESSURE: 132 MMHG | HEART RATE: 91 BPM | BODY MASS INDEX: 19.84 KG/M2 | HEIGHT: 64 IN | DIASTOLIC BLOOD PRESSURE: 80 MMHG | WEIGHT: 116.2 LBS

## 2021-08-24 DIAGNOSIS — E55.9 VITAMIN D DEFICIENCY: ICD-10-CM

## 2021-08-24 DIAGNOSIS — M81.0 OSTEOPOROSIS, UNSPECIFIED OSTEOPOROSIS TYPE, UNSPECIFIED PATHOLOGICAL FRACTURE PRESENCE: Primary | ICD-10-CM

## 2021-08-24 DIAGNOSIS — M81.0 AGE RELATED OSTEOPOROSIS, UNSPECIFIED PATHOLOGICAL FRACTURE PRESENCE: ICD-10-CM

## 2021-08-24 PROCEDURE — 99214 OFFICE O/P EST MOD 30 MIN: CPT | Performed by: NURSE PRACTITIONER

## 2021-08-24 PROCEDURE — 96372 THER/PROPH/DIAG INJ SC/IM: CPT

## 2021-08-24 NOTE — PATIENT INSTRUCTIONS
Vitamin D3 2,000 and Calcium 1,200 mg     Osteoporosis   WHAT YOU NEED TO KNOW:   What is osteoporosis? Osteoporosis is a long-term medical condition that causes your bones to become weak, brittle, and more likely to fracture  Osteoporosis occurs when your body absorbs more bone than it makes  It is also caused by a lack of calcium and estrogen (female hormone)  What increases my risk for osteoporosis? · Age older than 28    · Low estrogen levels    · Female gender    · Alcohol, tobacco, or caffeine    · Lack of calcium and vitamin D in your foods    · Lack of exercise    · Some illnesses, such as thyroid diseases, bone cancer, and long-term lung diseases    · Certain medicines, such as steroids, anticonvulsants, and blood-thinners    What are the signs and symptoms of osteoporosis? You may not have any signs or symptoms  You may break a bone after a muscle strain, bump, or fall  A break usually occurs in the hip, spine, or wrist  A collapsed vertebra (bone in your spine) may cause severe back pain or loss of height from bent posture  How is osteoporosis diagnosed? · Blood and urine tests  measure your calcium, vitamin D, and estrogen levels  · An x-ray or CT  may show thinned bones or a fracture  You may be given contrast liquid to help the bones show up better in the pictures  Tell the healthcare provider if you have ever had an allergic reaction to contrast liquid  Do not enter the MRI room with anything metal  Metal can cause serious injury  Tell the healthcare provider if you have any metal in or on your body  · A bone density test  compares your bone thickness with what is expected for someone of your age, gender, and ethnicity  How is osteoporosis treated? Medicines may be given to prevent bone loss, build new bone, and increase estrogen  These medicines help prevent fractures and may be given as a pill or injection   Ask your healthcare provider for more information on these medicines  How can I help prevent bone loss? · Eat healthy foods that are high in calcium  This helps keep your bones strong  Good sources of calcium are milk, cheese, broccoli, tofu, almonds, and canned salmon and sardines  · Increase your vitamin D intake  Vitamin D is in fish oils, some vegetables, and fortified milk, cereal, and bread  Vitamin D is also formed in the skin when it is exposed to the sun  Ask your healthcare provider how much sunlight is safe for you  · Drink liquids as directed  Ask your healthcare provider how much liquid to drink each day and which liquids are best for you  Do not have alcohol or caffeine  They decrease bone mineral density, which can weaken your bones  · Exercise regularly  Ask your healthcare provider about the best exercise plan for you  Weight bearing exercise for 30 minutes, 3 times a week can help build and strengthen bone  · Do not smoke  Nicotine and other chemicals in cigarettes and cigars can cause lung damage  Ask your healthcare provider for information if you currently smoke and need help to quit  E-cigarettes or smokeless tobacco still contain nicotine  Talk to your healthcare provider before you use these products  · Go to physical therapy as directed  A physical therapist teaches you exercises to help improve movement and muscle strength  When should I call my doctor? · You have severe pain  · You have increasing pain after a fall  · You have pain when you do your daily activities  · You have questions or concerns about your condition or care  CARE AGREEMENT:   You have the right to help plan your care  Learn about your health condition and how it may be treated  Discuss treatment options with your healthcare providers to decide what care you want to receive  You always have the right to refuse treatment  The above information is an  only   It is not intended as medical advice for individual conditions or treatments  Talk to your doctor, nurse or pharmacist before following any medical regimen to see if it is safe and effective for you  © Copyright [employer] 2021 Information is for End User's use only and may not be sold, redistributed or otherwise used for commercial purposes   All illustrations and images included in CareNotes® are the copyrighted property of A [employer] A M [employer]  or [address] [place] [address]

## 2021-08-24 NOTE — ASSESSMENT & PLAN NOTE
Prolia recived today  Continue calcium and vitamin d supplementation  She is overdue for DEXA scan  Script given   Educated on falls prevention and importance of weight bearing exercise

## 2021-08-24 NOTE — PROGRESS NOTES
Established Patient Progress Note       Chief Complaint   Patient presents with    Osteoporosis        History of Present Illness:     Ghanshyam Bee is a 77 y o  female with a history of osteoporosis and vitamin d deficiency  For the osteoporosis she is currently on Prolia  She was previously on reclast and add a total of four doses back in 2016  In addition to Prolia she is taking calcium and vitamin d supplementation  She denies any recent falls or fractures  She was walking more previously but has not been very active recently           Patient Active Problem List   Diagnosis    Osteoporosis    Vitamin D deficiency      Past Medical History:   Diagnosis Date    Hx of skin cancer, basal cell     Osteoporosis       Past Surgical History:   Procedure Laterality Date    APPENDECTOMY      BREAST CYST ASPIRATION Left 1990    benign    HERNIA REPAIR      TUBAL LIGATION        Family History   Problem Relation Age of Onset    Diabetes unspecified Mother     Breast cancer Mother 72    No Known Problems Father     No Known Problems Sister     No Known Problems Maternal Grandmother     No Known Problems Maternal Grandfather     No Known Problems Paternal Grandmother     No Known Problems Paternal Grandfather     No Known Problems Maternal Aunt     No Known Problems Maternal Aunt     No Known Problems Paternal Aunt      Social History     Tobacco Use    Smoking status: Never Smoker    Smokeless tobacco: Never Used   Substance Use Topics    Alcohol use: No     Allergies   Allergen Reactions    Oxycodone GI Intolerance    Oxycodone-Aspirin        Current Outpatient Medications:     Cholecalciferol ([employer] VITAMIN D) 2000 units CAPS, Take by mouth, Disp: , Rfl:     clonazePAM (KlonoPIN) 0 5 mg tablet, Take 0 5 mg by mouth Takes morning at breakfast, after lunch and after dinner, bedtime , Disp: , Rfl:     fluticasone (FLONASE) 50 mcg/act nasal spray, 1 spray into each nostril daily, Disp: , Rfl:   Glucos-Chond-Hyal Ac-Ca Fructo (MOVE FREE JOINT HEALTH ADVANCE) TABS, Take by mouth, Disp: , Rfl:     Multiple Vitamin (DAILY VALUE MULTIVITAMIN) TABS, Take by mouth, Disp: , Rfl:     OLANZapine (ZyPREXA) 10 mg tablet, Take 10 mg by mouth daily, Disp: , Rfl: 1    zoledronic acid (RECLAST) 5 mg/100 mL IV infusion (premix), Zoledronic Acid 5 MG/100ML Intravenous Solution INFUSE 5MG INTRAVENOUSLY OVER 15 MINUTES AS DIRECTED  Quantity: 1;  Refills: 0    Dawna GARRETT ;  Started 12-Sep-2014 [id number] ML Bottle (Patient not taking: Reported on 8/24/2021), Disp: , Rfl:     Current Facility-Administered Medications:     denosumab (PROLIA) subcutaneous injection 60 mg, 60 mg, Subcutaneous, Q6 Months, Damon Youngblood MD, 60 mg at 06/05/20 1440    Review of Systems   Constitutional: Negative for chills and fever  HENT: Negative for sore throat and trouble swallowing  Eyes: Negative for visual disturbance  Respiratory: Negative for shortness of breath  Cardiovascular: Negative for chest pain and palpitations  Gastrointestinal: Negative for abdominal pain, constipation and diarrhea  Endocrine: Negative for cold intolerance, heat intolerance, polydipsia, polyphagia and polyuria  Genitourinary: Negative for frequency  Musculoskeletal: Negative for arthralgias and myalgias  Skin: Negative for rash  Neurological: Negative for dizziness and tremors  Hematological: Negative for adenopathy  Psychiatric/Behavioral: Negative for sleep disturbance  All other systems reviewed and are negative  Physical Exam:  Body mass index is 19 95 kg/m²  /80   Pulse 91   Ht 5' 4" (1 626 m)   Wt 52 7 kg (116 lb 3 2 oz)   BMI 19 95 kg/m²    Wt Readings from Last 3 Encounters:   08/24/21 52 7 kg (116 lb 3 2 oz)   10/28/20 49 9 kg (110 lb)   07/10/20 48 kg (105 lb 12 8 oz)       Physical Exam  Vitals reviewed  Constitutional:       General: She is not in acute distress       Appearance: She is well-developed  HENT:      Head: Normocephalic and atraumatic  Eyes:      Conjunctiva/sclera: Conjunctivae normal       Pupils: Pupils are equal, round, and reactive to light  Neck:      Thyroid: No thyromegaly  Cardiovascular:      Rate and Rhythm: Normal rate and regular rhythm  Heart sounds: Normal heart sounds  Pulmonary:      Effort: Pulmonary effort is normal  No respiratory distress  Breath sounds: Normal breath sounds  No wheezing or rales  Abdominal:      General: Bowel sounds are normal  There is no distension  Palpations: Abdomen is soft  Tenderness: There is no abdominal tenderness  Musculoskeletal:         General: Normal range of motion  Cervical back: Normal range of motion and neck supple  Skin:     General: Skin is warm and dry  Neurological:      Mental Status: She is alert and oriented to person, place, and time  Labs:     CENTRAL  DXA SCAN 9/14/18     CLINICAL HISTORY:   71year old post-menopausal  female risk factors include estrogen deficiency  Osteopenia      TECHNIQUE: Bone densitometry was performed using a jslyhl's W bone densitometer  Regions of interest appear properly placed  There are   other confounding variables which could limit the study  Degenerative or osteoarthritic changes are noted on   the spine image eliminating L4 from evaluation      COMPARISON:  Follow-up      RESULTS:   LUMBAR SPINE:  L1-L3:  BMD 0 802 gm/cm2  T-score below normal, -2 0 and unchanged from 2016 and 1% less than 2014  Z-score 0 2     LEFT TOTAL HIP:  BMD 0 631 gm/cm2  T-score below normal, -2 6, osteoporosis  Z-score -0 9     LEFT FEMORAL NECK:  BMD 0 609 gm/cm2  T-score below normal, -2 2 and 4% less than 2016 but 14% higher than 2014 when the T score was -2 8   Z-score -0 3     The left forearm BMD is 0 594 and the T score is below normal, -1 7    The Z score is 0 6      A 25-hydroxy vitamin D level, an intact PTH, and a comprehensive metabolic panel are suggested in this patient prior to treatment      Treatment might include intravenous Reclast or Prolia     IMPRESSION:  1  Based on the [facility] classification, this study identifies a diagnosis of osteoporosis notable at the total hip area and the patient is considered at  risk for fracture         2  A daily intake of calcium of at least 1200 mg and vitamin D, 800-1000 IU, as well as weight bearing and muscle strengthening exercise, fall prevention and avoidance of tobacco and excessive alcohol intake as basic preventive measures are recommended      3  Repeat DXA scan on the same equipment in 18-24 months as clinically indicated         The 10 year risk of hip fracture is 2 7%, with the 10 year risk of major osteoporotic fracture being 11%, as calculated by the WHO fracture risk assessment tool (FRAX)  The current NOF guidelines recommend treating patients with FRAX 10 year risk score   of >3% for hip fracture and >20% for major osteoporotic fracture           Impression & Plan:    Problem List Items Addressed This Visit        Musculoskeletal and Integument    Osteoporosis - Primary     Prolia recived today  Continue calcium and vitamin d supplementation  She is overdue for DEXA scan  Script given  Educated on falls prevention and importance of weight bearing exercise         Relevant Medications    denosumab (PROLIA) subcutaneous injection 60 mg (Completed)    Other Relevant Orders    DXA bone density spine hip and pelvis       Other    Vitamin D deficiency     Continue vitamin d supplementation                Orders Placed This Encounter   Procedures    DXA bone density spine hip and pelvis     Standing Status:   Future     Standing Expiration Date:   8/24/2025     Scheduling Instructions:      Please wear comfortable clothing with no metal buttons, zippers, snaps or an underwire bra   Do not take any calcium supplements 24 hours prior to your test  Please bring your insurance cards, a form of photo ID and a list of your medications with you  Arrive 5-10 minutes prior to your appointment time in order to register  Your study cannot be performed if you take your calcium supplement 24 hours before the scheduled Dexa scan examination  To schedule this appointment, please contact Central Scheduling at [phone]  Patient Instructions     Vitamin D3 2,000 and Calcium 1,200 mg     Osteoporosis   WHAT YOU NEED TO KNOW:   What is osteoporosis? Osteoporosis is a long-term medical condition that causes your bones to become weak, brittle, and more likely to fracture  Osteoporosis occurs when your body absorbs more bone than it makes  It is also caused by a lack of calcium and estrogen (female hormone)  What increases my risk for osteoporosis? · Age older than 28    · Low estrogen levels    · Female gender    · Alcohol, tobacco, or caffeine    · Lack of calcium and vitamin D in your foods    · Lack of exercise    · Some illnesses, such as thyroid diseases, bone cancer, and long-term lung diseases    · Certain medicines, such as steroids, anticonvulsants, and blood-thinners    What are the signs and symptoms of osteoporosis? You may not have any signs or symptoms  You may break a bone after a muscle strain, bump, or fall  A break usually occurs in the hip, spine, or wrist  A collapsed vertebra (bone in your spine) may cause severe back pain or loss of height from bent posture  How is osteoporosis diagnosed? · Blood and urine tests  measure your calcium, vitamin D, and estrogen levels  · An x-ray or CT  may show thinned bones or a fracture  You may be given contrast liquid to help the bones show up better in the pictures  Tell the healthcare provider if you have ever had an allergic reaction to contrast liquid  Do not enter the MRI room with anything metal  Metal can cause serious injury  Tell the healthcare provider if you have any metal in or on your body      · A bone density test  compares your bone thickness with what is expected for someone of your age, gender, and ethnicity  How is osteoporosis treated? Medicines may be given to prevent bone loss, build new bone, and increase estrogen  These medicines help prevent fractures and may be given as a pill or injection  Ask your healthcare provider for more information on these medicines  How can I help prevent bone loss? · Eat healthy foods that are high in calcium  This helps keep your bones strong  Good sources of calcium are milk, cheese, broccoli, tofu, almonds, and canned salmon and sardines  · Increase your vitamin D intake  Vitamin D is in fish oils, some vegetables, and fortified milk, cereal, and bread  Vitamin D is also formed in the skin when it is exposed to the sun  Ask your healthcare provider how much sunlight is safe for you  · Drink liquids as directed  Ask your healthcare provider how much liquid to drink each day and which liquids are best for you  Do not have alcohol or caffeine  They decrease bone mineral density, which can weaken your bones  · Exercise regularly  Ask your healthcare provider about the best exercise plan for you  Weight bearing exercise for 30 minutes, 3 times a week can help build and strengthen bone  · Do not smoke  Nicotine and other chemicals in cigarettes and cigars can cause lung damage  Ask your healthcare provider for information if you currently smoke and need help to quit  E-cigarettes or smokeless tobacco still contain nicotine  Talk to your healthcare provider before you use these products  · Go to physical therapy as directed  A physical therapist teaches you exercises to help improve movement and muscle strength  When should I call my doctor? · You have severe pain  · You have increasing pain after a fall  · You have pain when you do your daily activities      · You have questions or concerns about your condition or care     CARE AGREEMENT:   You have the right to help plan your care  Learn about your health condition and how it may be treated  Discuss treatment options with your healthcare providers to decide what care you want to receive  You always have the right to refuse treatment  The above information is an  only  It is not intended as medical advice for individual conditions or treatments  Talk to your doctor, nurse or pharmacist before following any medical regimen to see if it is safe and effective for you  © Copyright [employer] 2021 Information is for End User's use only and may not be sold, redistributed or otherwise used for commercial purposes  All illustrations and images included in CareNotes® are the copyrighted property of [employer]  or [address]          Discussed with the patient and all questioned fully answered  She will call me if any problems arise  Follow-up appointment in 6 months       Counseled patient on diagnostic results, prognosis, risk and benefit of treatment options, instruction for management, importance of treatment compliance, Risk  factor reduction and impressions      Jorge Luis Biswas [address]

## 2021-11-09 ENCOUNTER — HOSPITAL ENCOUNTER (OUTPATIENT)
Dept: BONE DENSITY | Facility: MEDICAL CENTER | Age: 76
Discharge: HOME | End: 2021-11-09
Payer: MEDICARE

## 2021-11-09 DIAGNOSIS — M81.0 OSTEOPOROSIS, UNSPECIFIED OSTEOPOROSIS TYPE, UNSPECIFIED PATHOLOGICAL FRACTURE PRESENCE: ICD-10-CM

## 2021-11-09 PROCEDURE — 77080 DXA BONE DENSITY AXIAL: CPT

## 2022-01-28 ENCOUNTER — TELEPHONE (OUTPATIENT)
Dept: OBGYN CLINIC | Facility: CLINIC | Age: 77
End: 2022-01-28

## 2022-01-28 DIAGNOSIS — Z12.31 ENCOUNTER FOR SCREENING MAMMOGRAM FOR BREAST CANCER: Primary | ICD-10-CM

## 2022-02-16 ENCOUNTER — TELEPHONE (OUTPATIENT)
Dept: ENDOCRINOLOGY | Facility: CLINIC | Age: 77
End: 2022-02-16